# Patient Record
Sex: MALE | Race: BLACK OR AFRICAN AMERICAN | Employment: FULL TIME | URBAN - METROPOLITAN AREA
[De-identification: names, ages, dates, MRNs, and addresses within clinical notes are randomized per-mention and may not be internally consistent; named-entity substitution may affect disease eponyms.]

---

## 2021-07-27 ENCOUNTER — HOSPITAL ENCOUNTER (EMERGENCY)
Age: 46
Discharge: HOME | End: 2021-07-28
Attending: EMERGENCY MEDICINE
Payer: COMMERCIAL

## 2021-07-27 VITALS
TEMPERATURE: 97 F | RESPIRATION RATE: 18 BRPM | HEIGHT: 74 IN | HEART RATE: 71 BPM | BODY MASS INDEX: 34.01 KG/M2 | SYSTOLIC BLOOD PRESSURE: 148 MMHG | OXYGEN SATURATION: 99 % | WEIGHT: 265 LBS | DIASTOLIC BLOOD PRESSURE: 95 MMHG

## 2021-07-27 DIAGNOSIS — T14.8XXA MUSCLE STRAIN: Primary | ICD-10-CM

## 2021-07-27 PROCEDURE — 96372 THER/PROPH/DIAG INJ SC/IM: CPT

## 2021-07-27 PROCEDURE — 99284 EMERGENCY DEPT VISIT MOD MDM: CPT

## 2021-07-27 RX ORDER — AMLODIPINE BESYLATE 10 MG/1
10 TABLET ORAL DAILY
COMMUNITY
Start: 2021-06-08

## 2021-07-27 RX ORDER — ATORVASTATIN CALCIUM 20 MG/1
20 TABLET, FILM COATED ORAL DAILY
COMMUNITY
Start: 2021-06-08

## 2021-07-27 ASSESSMENT — PAIN DESCRIPTION - LOCATION: LOCATION: SHOULDER;BACK

## 2021-07-27 ASSESSMENT — PAIN DESCRIPTION - DESCRIPTORS: DESCRIPTORS: DISCOMFORT

## 2021-07-27 ASSESSMENT — PAIN SCALES - GENERAL: PAINLEVEL_OUTOF10: 10

## 2021-07-28 ENCOUNTER — APPOINTMENT (OUTPATIENT)
Dept: GENERAL RADIOLOGY | Age: 46
End: 2021-07-28
Payer: COMMERCIAL

## 2021-07-28 PROCEDURE — 6360000002 HC RX W HCPCS: Performed by: STUDENT IN AN ORGANIZED HEALTH CARE EDUCATION/TRAINING PROGRAM

## 2021-07-28 PROCEDURE — 73030 X-RAY EXAM OF SHOULDER: CPT

## 2021-07-28 PROCEDURE — 6370000000 HC RX 637 (ALT 250 FOR IP): Performed by: STUDENT IN AN ORGANIZED HEALTH CARE EDUCATION/TRAINING PROGRAM

## 2021-07-28 RX ORDER — ORPHENADRINE CITRATE 30 MG/ML
60 INJECTION INTRAMUSCULAR; INTRAVENOUS ONCE
Status: COMPLETED | OUTPATIENT
Start: 2021-07-28 | End: 2021-07-28

## 2021-07-28 RX ORDER — ACETAMINOPHEN 500 MG
1000 TABLET ORAL ONCE
Status: COMPLETED | OUTPATIENT
Start: 2021-07-28 | End: 2021-07-28

## 2021-07-28 RX ORDER — CYCLOBENZAPRINE HCL 10 MG
10 TABLET ORAL 3 TIMES DAILY PRN
Qty: 21 TABLET | Refills: 0 | Status: SHIPPED | OUTPATIENT
Start: 2021-07-28 | End: 2021-08-07

## 2021-07-28 RX ORDER — IBUPROFEN 800 MG/1
800 TABLET ORAL ONCE
Status: COMPLETED | OUTPATIENT
Start: 2021-07-28 | End: 2021-07-28

## 2021-07-28 RX ADMIN — IBUPROFEN 800 MG: 800 TABLET, FILM COATED ORAL at 00:48

## 2021-07-28 RX ADMIN — ACETAMINOPHEN 1000 MG: 500 TABLET ORAL at 00:48

## 2021-07-28 RX ADMIN — ORPHENADRINE CITRATE 60 MG: 60 INJECTION INTRAMUSCULAR; INTRAVENOUS at 00:48

## 2021-07-28 ASSESSMENT — ENCOUNTER SYMPTOMS
COUGH: 0
SHORTNESS OF BREATH: 0

## 2021-07-28 ASSESSMENT — PAIN SCALES - GENERAL: PAINLEVEL_OUTOF10: 8

## 2021-07-28 NOTE — ED NOTES
Bed: 39  Expected date:   Expected time:   Means of arrival:   Comments:     Donell Shaw RN  07/27/21 2179

## 2021-07-28 NOTE — ED NOTES
Pt came to ED after having pain in his shoulder and back at work. Pt states he can move the shoulder but it is very painful. Pt took 2 \"pain pills\" pta around 1600.  Pt is a&o x4 in NAD with all vitals stable      Jhon Rascon RN  07/27/21 4191

## 2021-07-28 NOTE — PROGRESS NOTES
I did not evaluate patient or participate in care. Please see separate resident note.     Linda Duncan, PGY 3

## 2021-07-28 NOTE — ED PROVIDER NOTES
[facility] ED     Emergency Department     Faculty Attestation        I performed a history and physical examination of the patient and discussed management with the resident. I reviewed the residents note and agree with the documented findings and plan of care. Any areas of disagreement are noted on the chart. I was personally present for the key portions of any procedures. I have documented in the chart those procedures where I was not present during the key portions. I have reviewed the emergency nurses triage note. I agree with the chief complaint, past medical history, past surgical history, allergies, medications, social and family history as documented unless otherwise noted below. For Physician Assistant/ Nurse Practitioner cases/documentation I have personally evaluated this patient and have completed at least one if not all key elements of the E/M (history, physical exam, and MDM). Additional findings are as noted. Vital Signs: BP: (!) 148/95  Pulse: 71  Resp: 18  Temp: 97 °F (36.1 °C) SpO2: 99 %  PCP:  No primary care provider on file. Pertinent Comments:     Patient is a 62-year-old male who was at the [address] [place] [address] using a \"torque gun\" which is quite heavy, and experienced sudden onset of left shoulder pain that radiated down his arm. This is relieved by rest and exacerbated by movement as well as palpation. Denies any distal weakness but has pain with movement at the shoulder. Patient is strong radial/ulnar pulses palpated with capillary refill brisk and less than 2 seconds. Distal strength is limited mildly by pain but can push through to 5/5 strength and sensation light touch intact as well. Patient has tenderness over the shoulder rotator cuff area but also specifically over the trapezial area. No bony midline pain and no scapular pain or midline thoracic pain either.    Assessment/plan: Likely muscle spasm/strain but will obtain shoulder x-ray as well as attempt symptomatic control and reevaluate after. Critical Care  None    This patient was evaluated in the Emergency Department for symptoms described in the history of present illness. He/she was evaluated in the context of the global COVID-19 pandemic, which necessitated consideration that the patient might be at risk for infection with the SARS-CoV-2 virus that causes COVID-19. Institutional protocols and algorithms that pertain to the evaluation of patients at risk for COVID-19 are in a state of rapid change based on information released by regulatory bodies including the CDC and federal and state organizations. These policies and algorithms were followed during the patient's care in the ED. (Please note that portions of this note were completed with a voice recognition program. Efforts were made to edit the dictations but occasionally words are mis-transcribed.  Whenever words are used in this note in any gender, they shall be construed as though they were used in the gender appropriate to the circumstances; and whenever words are used in this note in the singular or plural form, they shall be construed as though they were used in the form appropriate to the circumstances.)    MD Dipesh Bone  Attending Emergency Medicine Physician           Amita Gonzalez MD  07/28/21 Ezequiel Antunez MD  07/28/21 0020

## 2021-07-28 NOTE — ED PROVIDER NOTES
[facility]  ED  Emergency Department Encounter  EmergencyMedicine Resident     Pt Capo Abdul Lian [place]  MRN: [id number]  Birthdate 1975  Date of evaluation: 7/28/21  PCP:  No primary care provider on file. CHIEF COMPLAINT       Chief Complaint   Patient presents with    Back Pain    Shoulder Pain       HISTORY OF PRESENT ILLNESS  (Location/Symptom, Timing/Onset, Context/Setting, Quality, Duration, Modifying Factors, Severity.)      Kianna Nath is a 39 y.o. male who presents with left arm shoulder and back pain. Patient works at the [employer]. 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 Approximately 5 hours prior to arrival he was lifting 1 of these heavy torque guns and just felt his arm gave out. He reports shooting pain from his back all the way down to his arm. It is worse with movements. Patient states he works 6 to 7 days a week and is using a torque gun for the entirety of his shift. PAST MEDICAL / SURGICAL / SOCIAL / FAMILY HISTORY      has a past medical history of Hypertension, Obesity, and Smoker, current status unknown.     has no past surgical history on file.     Social History     Socioeconomic History    Marital status: Single     Spouse name: Not on file    Number of children: Not on file    Years of education: Not on file    Highest education level: Not on file   Occupational History    Not on file   Tobacco Use    Smoking status: Current Every Day Smoker     Packs/day: 0.50     Years: 8.00     Pack years: 4.00     Types: Cigarettes    Smokeless tobacco: Former User   Substance and Sexual Activity    Alcohol use: Yes     Comment: occassionally    Drug use: Not Currently     Types: Marijuana    Sexual activity: Not on file   Other Topics Concern    Not on file   Social History Narrative    Not on file     Social Determinants of Health     Financial Resource Strain:     Difficulty of Paying Living Expenses:    Food Insecurity:     Worried About Running Out of Food in the Last Year:     [address] in the Last Year:    Transportation Needs:     Lack of Transportation (Medical):  Lack of Transportation (Non-Medical):    Physical Activity:     Days of Exercise per Week:     Minutes of Exercise per Session:    Stress:     Feeling of Stress :    Social Connections:     Frequency of Communication with Friends and Family:     Frequency of Social Gatherings with Friends and Family:     Attends [employer] Services:     Active Member of Clubs or Organizations:     Attends Club or Organization Meetings:     Marital Status:    Intimate Partner Violence:     Fear of Current or Ex-Partner:     Emotionally Abused:     Physically Abused:     Sexually Abused:        Family History   Problem Relation Age of Onset    Heart Disease Mother     High Blood Pressure Mother     Stroke Father     High Blood Pressure Father     Diabetes Sister     Diabetes Brother        Allergies:  Patient has no known allergies. Home Medications:  Prior to Admission medications    Medication Sig Start Date End Date Taking? Authorizing Provider   cyclobenzaprine (FLEXERIL) 10 MG tablet Take 1 tablet by mouth 3 times daily as needed for Muscle spasms 7/28/21 8/7/21 Yes Eduin Garza DO   amLODIPine (NORVASC) 10 MG tablet Take 10 mg by mouth daily 6/8/21  Yes Historical Provider, MD   atorvastatin (LIPITOR) 20 MG tablet Take 20 mg by mouth daily 6/8/21  Yes Historical Provider, MD       REVIEW OF SYSTEMS    (2-9 systems for level 4, 10 or more for level 5)      Review of Systems   Constitutional: Negative for chills and fever. Respiratory: Negative for cough and shortness of breath. Cardiovascular: Negative for chest pain. Musculoskeletal: Positive for arthralgias, neck pain and neck stiffness. Skin: Negative for wound. Neurological: Negative for weakness, numbness and headaches. PHYSICAL EXAM   (up to 7 for level 4, 8 or more for level 5)      INITIAL VITALS:   BP (!) 148/95   Pulse 71   Temp 97 °F (36.1 °C) (Oral)   Resp 18   Ht 6' 2\" (1.88 m)   Wt 265 lb (120.2 kg)   SpO2 99%   BMI 34.02 kg/m²      Vitals:    07/27/21 2055   BP: (!) 148/95   Pulse: 71   Resp: 18   Temp: 97 °F (36.1 °C)   TempSrc: Oral   SpO2: 99%   Weight: 265 lb (120.2 kg)   Height: 6' 2\" (1.88 m)        Physical Exam  Vitals reviewed. Constitutional:       General: He is not in acute distress. Appearance: He is well-developed. He is not ill-appearing. HENT:      Head: Normocephalic and atraumatic. Eyes:      Extraocular Movements: Extraocular movements intact. Pupils: Pupils are equal, round, and reactive to light. Neck:      Comments: No midline tenderness. To cervical thoracic or lumbar spine. Cardiovascular:      Rate and Rhythm: Normal rate and regular rhythm. Pulmonary:      Effort: Pulmonary effort is normal.      Breath sounds: Normal breath sounds. Abdominal:      General: There is no distension. Palpations: Abdomen is soft. Tenderness: There is no abdominal tenderness. Musculoskeletal:         General: Tenderness present. Normal range of motion. Cervical back: Normal range of motion and neck supple. Tenderness present. No rigidity. Comments: Patient has tenderness along the trapezius muscle on the left side, pain also going into suprascapular areas. Range of motion shoulder normal but limited to pain. Good  strength of left upper extremity, good flexion extraction strength of upper extremity     upper and lower extremities neurovascularly intact. Skin:     General: Skin is warm and dry. Neurological:      General: No focal deficit present. Mental Status: He is alert and oriented to person, place, and time. Sensory: No sensory deficit. Motor: No weakness.          DIFFERENTIAL  DIAGNOSIS     PLAN (LABS / IMAGING / EKG):  Orders Placed This Encounter   Procedures    XR SHOULDER LEFT (MIN 2 VIEWS)       MEDICATIONS ORDERED:  Orders Placed This Encounter   Medications    acetaminophen (TYLENOL) tablet 1,000 mg    ibuprofen (ADVIL;MOTRIN) tablet 800 mg    orphenadrine (NORFLEX) injection 60 mg    cyclobenzaprine (FLEXERIL) 10 MG tablet     Sig: Take 1 tablet by mouth 3 times daily as needed for Muscle spasms     Dispense:  21 tablet     Refill:  0       DIAGNOSTIC RESULTS / EMERGENCY DEPARTMENT COURSE / MDM   LAB RESULTS:  No results found for this visit on 07/27/21. RADIOLOGY:  XR SHOULDER LEFT (MIN 2 VIEWS)   Final Result   No acute osseous abnormality. EKG      All EKG's are interpreted by the Emergency Department Physician who either signs or Co-signs this chart in the absence of a cardiologist.      INITIAL IMPRESSION:     Muscle strain    [place]:    Patient here with left shoulder, trapezius pain. Likely a muscle strain and overuse injury as he works 6 to 7 days a week always using the torque on for his entire shift. He reports that these tokens are quite heavy. X-ray was ordered to eval for any osseous abnormality of the shoulder, x-ray was negative. Patient was treated with anti-inflammatories and muscle relaxer. Follow-up to [facility] occupational health. Instructed do not lift anything over 5 pounds with that arm until following up with occupational health. ED Course as of Jul 28 0443   Wed Jul 28, 2021   0101 Xray negative   XR SHOULDER LEFT (MIN 2 VIEWS) [CS]      ED Course User Index  [CS] Raudel Shelby DO         PROCEDURES:      CONSULTS:  None    CRITICAL CARE:  Please see attending note    FINAL IMPRESSION      1.  Muscle strain          DISPOSITION / PLAN     DISPOSITION Decision To Discharge 07/28/2021 01:27:07 AM      PATIENT REFERRED TO:  [place] [address] [place] [address]  [phone]          DISCHARGE MEDICATIONS:  Discharge Medication List as of 7/28/2021  1:29 AM          Ester Chen DO  Emergency Medicine Resident    (Please note that portions of thisnote were completed with a voice recognition program.  Efforts were made to edit the dictations but occasionally words are mis-transcribed.),       Ester Chen DO  Resident  07/28/21 7766